# Patient Record
Sex: MALE | Race: ASIAN | NOT HISPANIC OR LATINO
[De-identification: names, ages, dates, MRNs, and addresses within clinical notes are randomized per-mention and may not be internally consistent; named-entity substitution may affect disease eponyms.]

---

## 2017-10-06 ENCOUNTER — APPOINTMENT (OUTPATIENT)
Dept: UROLOGY | Facility: CLINIC | Age: 42
End: 2017-10-06

## 2017-10-06 ENCOUNTER — APPOINTMENT (OUTPATIENT)
Dept: UROLOGY | Facility: CLINIC | Age: 42
End: 2017-10-06
Payer: COMMERCIAL

## 2017-10-06 ENCOUNTER — OUTPATIENT (OUTPATIENT)
Dept: OUTPATIENT SERVICES | Facility: HOSPITAL | Age: 42
LOS: 1 days | End: 2017-10-06
Payer: COMMERCIAL

## 2017-10-06 VITALS
DIASTOLIC BLOOD PRESSURE: 78 MMHG | SYSTOLIC BLOOD PRESSURE: 116 MMHG | HEART RATE: 55 BPM | RESPIRATION RATE: 17 BRPM | TEMPERATURE: 98.1 F

## 2017-10-06 DIAGNOSIS — R35.0 FREQUENCY OF MICTURITION: ICD-10-CM

## 2017-10-06 DIAGNOSIS — N20.1 CALCULUS OF URETER: ICD-10-CM

## 2017-10-06 DIAGNOSIS — N28.1 CYST OF KIDNEY, ACQUIRED: ICD-10-CM

## 2017-10-06 DIAGNOSIS — Z98.89 OTHER SPECIFIED POSTPROCEDURAL STATES: Chronic | ICD-10-CM

## 2017-10-06 DIAGNOSIS — N13.5 CROSSING VESSEL AND STRICTURE OF URETER W/OUT HYDRONEPHROSIS: ICD-10-CM

## 2017-10-06 PROCEDURE — 99213 OFFICE O/P EST LOW 20 MIN: CPT | Mod: 25

## 2017-10-06 PROCEDURE — 76775 US EXAM ABDO BACK WALL LIM: CPT | Mod: 26

## 2017-10-06 PROCEDURE — 76775 US EXAM ABDO BACK WALL LIM: CPT

## 2017-10-17 DIAGNOSIS — N28.1 CYST OF KIDNEY, ACQUIRED: ICD-10-CM

## 2017-10-17 DIAGNOSIS — N20.1 CALCULUS OF URETER: ICD-10-CM

## 2017-10-17 DIAGNOSIS — N13.5 CROSSING VESSEL AND STRICTURE OF URETER WITHOUT HYDRONEPHROSIS: ICD-10-CM

## 2019-03-08 ENCOUNTER — APPOINTMENT (OUTPATIENT)
Dept: UROLOGY | Facility: CLINIC | Age: 44
End: 2019-03-08
Payer: COMMERCIAL

## 2019-03-08 VITALS
SYSTOLIC BLOOD PRESSURE: 114 MMHG | BODY MASS INDEX: 24.33 KG/M2 | TEMPERATURE: 97.7 F | HEART RATE: 68 BPM | HEIGHT: 67 IN | WEIGHT: 155 LBS | RESPIRATION RATE: 17 BRPM | DIASTOLIC BLOOD PRESSURE: 77 MMHG

## 2019-03-08 PROCEDURE — 99213 OFFICE O/P EST LOW 20 MIN: CPT

## 2019-03-08 NOTE — ASSESSMENT
[FreeTextEntry1] : Patient is a 44 yo M who presents for f/u of nephrolithiasis.\par Last stone episode in 2016.\par No new complaints.\par Will obtain renal sono.\par Will call with results.\par If normal, f/u 2 yrs

## 2019-03-08 NOTE — HISTORY OF PRESENT ILLNESS
[FreeTextEntry1] : Patient is a 44 yo M who presents for follow up of nephrolithiasis and ureteral stricture.  He had an obstructing R prox ureteral stone in 2016.  Initial attempt at R URS revealed two areas of ureteral stricture, and stone was unable to be reached.  After undergoing stenting, he ultimately underwent R URS/LL/stone and stent removal in 12/2015.\par Stone analysis is 80% CaPh, 10% CaOxM, 10% CaCarb\par He is currently doing well.  No voiding complaints.  No flank pain.  No fever/chills.  No new stones.  No dysuria or hematuria.  He reports he has maintained his diet changes and is drinking lots of fluids.\par Last renal sono in follow was 10/2017 - no hydro or stones. Small L renal cyst. B/l ureteral jets noted.

## 2019-03-25 ENCOUNTER — FORM ENCOUNTER (OUTPATIENT)
Age: 44
End: 2019-03-25

## 2019-03-26 ENCOUNTER — OUTPATIENT (OUTPATIENT)
Dept: OUTPATIENT SERVICES | Facility: HOSPITAL | Age: 44
LOS: 1 days | End: 2019-03-26
Payer: COMMERCIAL

## 2019-03-26 ENCOUNTER — APPOINTMENT (OUTPATIENT)
Dept: ULTRASOUND IMAGING | Facility: IMAGING CENTER | Age: 44
End: 2019-03-26
Payer: COMMERCIAL

## 2019-03-26 DIAGNOSIS — Z98.89 OTHER SPECIFIED POSTPROCEDURAL STATES: Chronic | ICD-10-CM

## 2019-03-26 DIAGNOSIS — N20.0 CALCULUS OF KIDNEY: ICD-10-CM

## 2019-03-26 PROCEDURE — 76770 US EXAM ABDO BACK WALL COMP: CPT | Mod: 26

## 2019-03-26 PROCEDURE — 76770 US EXAM ABDO BACK WALL COMP: CPT

## 2019-04-10 ENCOUNTER — TRANSCRIPTION ENCOUNTER (OUTPATIENT)
Age: 44
End: 2019-04-10

## 2023-01-31 ENCOUNTER — NON-APPOINTMENT (OUTPATIENT)
Age: 48
End: 2023-01-31

## 2023-02-20 ENCOUNTER — APPOINTMENT (OUTPATIENT)
Dept: ULTRASOUND IMAGING | Facility: CLINIC | Age: 48
End: 2023-02-20
Payer: COMMERCIAL

## 2023-02-20 PROCEDURE — 76775 US EXAM ABDO BACK WALL LIM: CPT

## 2023-03-03 ENCOUNTER — APPOINTMENT (OUTPATIENT)
Dept: UROLOGY | Facility: CLINIC | Age: 48
End: 2023-03-03
Payer: COMMERCIAL

## 2023-03-03 VITALS
HEIGHT: 67 IN | SYSTOLIC BLOOD PRESSURE: 120 MMHG | RESPIRATION RATE: 17 BRPM | BODY MASS INDEX: 25.11 KG/M2 | DIASTOLIC BLOOD PRESSURE: 76 MMHG | HEART RATE: 66 BPM | WEIGHT: 160 LBS

## 2023-03-03 PROCEDURE — 99203 OFFICE O/P NEW LOW 30 MIN: CPT

## 2023-03-03 NOTE — REVIEW OF SYSTEMS
[Negative] : Heme/Lymph [Told you have blood in urine on a urine test] : told blood was present in a urine test [History of kidney stones] : history of kidney stones

## 2023-03-03 NOTE — ASSESSMENT
[FreeTextEntry1] : Patient is a 48 yo M who presents for nephrolithiasis.\par \par He is asymptomatic.\par There is small b/l nonobstructing stone\par Reviewed stone diet\par Also d/w pt surgical intervention w URS or ESWL, but with relatively small size would reasonably observe\par Also d/w pt Nephrology referral for metabolic analysis/medical mgmt\par \par At this time, will repeat renal US in 6-12\par If stable, pt agrees to continue to observe, if increased in size will see Nephrology and obtain KUB for poss ESWL

## 2023-03-03 NOTE — HISTORY OF PRESENT ILLNESS
[FreeTextEntry1] : Patient is a 46 yo M who presents for follow up of nephrolithiasis and ureteral stricture.  \par \par Last seen in 2019.  LTFU thereafter due to pandemic.\par He had an obstructing R prox ureteral stone in 2016.  Initial attempt at R URS revealed two areas of ureteral stricture, and stone was unable to be reached.  After undergoing stenting, he ultimately underwent R URS/LL/stone and stent removal in 12/2015.\par Stone analysis is 80% CaPh, 10% CaOxM, 10% CaCarb\par \par \par He is currently doing well.  No voiding complaints.  No flank pain.  No fever/chills.  No new stones.  No dysuria or hematuria.  He reports he has maintained his diet changes and is drinking lots of fluids - but not consistently drinking 2 L per day.\par \par Had recent f/u renal US showing b/l intrarenal stones nonobstructing -  6, 4mm on R, 4mm L.  On my review, stones are small\par

## 2023-03-06 ENCOUNTER — APPOINTMENT (OUTPATIENT)
Age: 48
End: 2023-03-06

## 2024-03-09 ENCOUNTER — OUTPATIENT (OUTPATIENT)
Dept: OUTPATIENT SERVICES | Facility: HOSPITAL | Age: 49
LOS: 1 days | End: 2024-03-09
Payer: COMMERCIAL

## 2024-03-09 ENCOUNTER — APPOINTMENT (OUTPATIENT)
Dept: ULTRASOUND IMAGING | Facility: IMAGING CENTER | Age: 49
End: 2024-03-09
Payer: COMMERCIAL

## 2024-03-09 DIAGNOSIS — N20.1 CALCULUS OF URETER: ICD-10-CM

## 2024-03-09 DIAGNOSIS — N20.0 CALCULUS OF KIDNEY: ICD-10-CM

## 2024-03-09 DIAGNOSIS — Z98.89 OTHER SPECIFIED POSTPROCEDURAL STATES: Chronic | ICD-10-CM

## 2024-03-09 PROCEDURE — 76770 US EXAM ABDO BACK WALL COMP: CPT | Mod: 26

## 2024-03-09 PROCEDURE — 76770 US EXAM ABDO BACK WALL COMP: CPT

## 2024-03-14 ENCOUNTER — NON-APPOINTMENT (OUTPATIENT)
Age: 49
End: 2024-03-14

## 2024-03-21 ENCOUNTER — APPOINTMENT (OUTPATIENT)
Dept: UROLOGY | Facility: CLINIC | Age: 49
End: 2024-03-21
Payer: COMMERCIAL

## 2024-03-21 ENCOUNTER — TRANSCRIPTION ENCOUNTER (OUTPATIENT)
Age: 49
End: 2024-03-21

## 2024-03-21 DIAGNOSIS — R31.0 GROSS HEMATURIA: ICD-10-CM

## 2024-03-21 DIAGNOSIS — N20.0 CALCULUS OF KIDNEY: ICD-10-CM

## 2024-03-21 PROCEDURE — 99212 OFFICE O/P EST SF 10 MIN: CPT

## 2024-03-21 NOTE — ASSESSMENT
[FreeTextEntry1] : Patient is a 47 yo M who presents for kidney stones.  Stable small stones on recent sono Reinforced fluid hydration/stone diet Plan for observation F/u 1 yr with renal sono prior

## 2024-03-21 NOTE — HISTORY OF PRESENT ILLNESS
[FreeTextEntry1] : Patient is a 47 yo M who presents for follow up of nephrolithiasis and ureteral stricture.    Last seen in 2019.  LTFU thereafter due to pandemic and returned 3/2023. He had an obstructing R prox ureteral stone in 2016.  Initial attempt at R URS revealed two areas of ureteral stricture, and stone was unable to be reached.  After undergoing stenting, he ultimately underwent R URS/LL/stone and stent removal in 12/2015. Stone analysis is 80% CaPh, 10% CaOxM, 10% CaCarb   He is currently doing well. No pain or complaints. Had recent f/u renal US showing b/l intrarenal stones nonobstructing -  up to 6 mm on R, 3mm L.  On my review, stones are small and stable as compared to prior year sono in 2023.  Note - visit completed via telephone due to technical issue

## 2024-03-21 NOTE — REASON FOR VISIT
[Home] : at home, [unfilled] , at the time of the visit. [Medical Office: ([facility])___] : at the medical office located in  [Patient] : the patient [This encounter was initiated by telehealth (audio with video) and converted to telephone (audio only) due to technical difficulties.] : This encounter was initiated by telehealth (audio with video) and converted to telephone (audio only) due to technical difficulties. [Follow-up Visit ___] : a follow-up visit  for [unfilled]

## 2024-04-06 ENCOUNTER — OUTPATIENT (OUTPATIENT)
Dept: OUTPATIENT SERVICES | Facility: HOSPITAL | Age: 49
LOS: 1 days | End: 2024-04-06
Payer: COMMERCIAL

## 2024-04-06 ENCOUNTER — APPOINTMENT (OUTPATIENT)
Dept: RADIOLOGY | Facility: IMAGING CENTER | Age: 49
End: 2024-04-06
Payer: COMMERCIAL

## 2024-04-06 DIAGNOSIS — Z98.89 OTHER SPECIFIED POSTPROCEDURAL STATES: Chronic | ICD-10-CM

## 2024-04-06 DIAGNOSIS — N20.0 CALCULUS OF KIDNEY: ICD-10-CM

## 2024-04-06 PROCEDURE — 74018 RADEX ABDOMEN 1 VIEW: CPT | Mod: 26

## 2024-04-06 PROCEDURE — 74018 RADEX ABDOMEN 1 VIEW: CPT

## 2024-04-08 ENCOUNTER — NON-APPOINTMENT (OUTPATIENT)
Age: 49
End: 2024-04-08